# Patient Record
(demographics unavailable — no encounter records)

---

## 2024-10-18 NOTE — HISTORY OF PRESENT ILLNESS
[DTaP/IPV] : DTaP/IPV [MMR/Varicella] : MMR/Varicella [FreeTextEntry1] : 4 year M here for immunization. Presently asymptomatic.  No history of adverse reactions to previous vaccines.

## 2024-10-18 NOTE — DISCUSSION/SUMMARY
[] : The components of the vaccine(s) to be administered today are listed in the plan of care. The disease(s) for which the vaccine(s) are intended to prevent and the risks have been discussed with the caretaker.  The risks are also included in the appropriate vaccination information statements which have been provided to the patient's caregiver.  The caregiver has given consent to vaccinate. [FreeTextEntry1] : Due for Quadracel and Proquad vaccine today. After discussing risks/ benefits, parent in agreement with administration. VIS given. Recommend warm/cold compress for any post vaccine pain, redness or swelling. Pain meds as needed

## 2024-12-05 NOTE — DISCUSSION/SUMMARY
[FreeTextEntry1] : Symptoms likely due to viral URI. Recommend supportive care including humidifier, and daily antihistamine: Claritin Children's or equivalent

## 2024-12-05 NOTE — PHYSICAL EXAM
[Pale Nasal Mucosa] : pale nasal mucosa [Clear Rhinorrhea] : clear rhinorrhea [Erythematous Oropharynx] : erythematous oropharynx [Enlarged Tonsils] : enlarged tonsils [NL] : warm, clear [de-identified] : postnasal drip

## 2024-12-05 NOTE — HISTORY OF PRESENT ILLNESS
[___ Week(s)] : [unfilled] week(s) [Intermittent] : intermittent [de-identified] : Cough and nasal congestion

## 2025-03-21 NOTE — PHYSICAL EXAM
[NL] : no acute distress, alert [de-identified] : erythematous, papulovesicular eruption on trunk, face and arms. Right hand with round, scaly, and itchy patch with a slightly raised, advancing border.

## 2025-03-21 NOTE — DISCUSSION/SUMMARY
[FreeTextEntry1] : 3 yo boy with atopic dermatitis and tinea corporis Recommend: -Keep area clean and dry. -Apply hydrocortisone 1% to affected area twice a day for at least 5 days. -Oral antihistamines such as diphenhydramine (Benadryl) can also relieve itching. -Protect the skin with an emollient ointment. (Aquaphor)  For tinea corporis: Recommendations: -After touching the area with ringworm, wash your hands before touching another area of your body. -Touching or scratching the area with ringworm and then touching another area can spread ringworm from one part of your body to another. -Keep the infected area clean and dry. -Change the clothes, including underwear and socks, every day. Wash the clothes before wearing them again. -Avoid sharing towels and other personal items. You can easily spread ringworm to others by sharing tels, hats, recinos, and other personal items. -Disinfect or throw out infected items. The fungi that cause ringworm can survive for a long time. -To avoid re-infecting yourself with infected items, you should wash clothes, towels, and bedding that you use while you have ringworm. Be sure to wash everything in hot, soapy water.  Parental questions and concerns addressed. If no improvement, consider peds. dermatology referral  Call the office or return if symptoms persist or worsen.

## 2025-03-21 NOTE — HISTORY OF PRESENT ILLNESS
[Derm Symptoms] : DERM SYMPTOMS [Rash] : rash [Trunk] : trunk [Extremities] : extremities [___ Week(s)] : [unfilled] week(s) [Recent URI] : recent URI [Erythematous] : erythematous [Itchy] : itchy [Scaly] : scaly [Dry] : dry [Bathing] : bathing [OTC creams/ointments] : OTC creams/ointments [Topical Steroids] : topical steroids [Pruritus] : pruritus [New Food] : no new food [New Clothing] : no new clothing [New Skin Products] : no new skin products [Sick Contacts: ___] : no sick contacts [Fever] : no fever [Vomiting] : no vomiting [Discharge from affected areas] : no discharge from affected areas [Diarrhea] : no diarrhea [Bleeding from affected areas] : no bleeding from affected areas [de-identified] : Was seen at  for rash, prescribed Clotrimazole but it did not work. Dog constantly licking his face.

## 2025-04-18 NOTE — REASON FOR VISIT
[Follow-Up Visit] : a follow-up visit for [Patient] : patient [Mother] : mother [Medical Records] : medical records [FreeTextEntry2] : Elevated lead level

## 2025-04-18 NOTE — FAMILY HISTORY
[Black/] : Black/ [White] : White [Age ___] : Age: [unfilled] [Healthy] : healthy [Full] : full brother [] : Non- [FreeTextEntry2] : Currently pregnant  [de-identified] : Hx of lead level elevation; Since resolved

## 2025-04-18 NOTE — PAST MEDICAL HISTORY
[At ___ Weeks Gestation] : at [unfilled] weeks gestation [[place] [ Section] : by  section [Jaundice] :  jaundice [Phototherapy] : phototherapy [NICU] : NICU [In Vitro Fertilization] : Pregnancy no in vitro fertilization [Transfusion] : no transfusion [Exchange Transfusion] : no exchange transfusion [Age Appropriate] : not age appropriate  [FreeTextEntry4] : Placental complications, heart rate deceleration, Maternal GDM, oligohydraminos, decreased fetal movement  [de-identified] : In NICU for two months  [FreeTextEntry5] : S/p feeding tube and NICU stay

## 2025-04-18 NOTE — REVIEW OF SYSTEMS
[Epistaxis] : epistaxis [Negative] : Musculoskeletal [FreeTextEntry8] : Incontinent baseline  [FreeTextEntry7] : See allergy list  [FreeTextEntry9] : Incontinent baseline  [de-identified] : Autism Spectrum Dx, non-verbal baseline   [de-identified] : Autism Spectrum, non-verbal baseline

## 2025-04-18 NOTE — SOCIAL HISTORY
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

## 2025-04-18 NOTE — PHYSICAL EXAM
[Thin] : thin [Normal] : normal appearance, no rash, nodules, vesicles, ulcers, erythema [de-identified] : Low muscle tone  [de-identified] : Patient delayed, baseline. Calm and cooperative  [de-identified] : Patient delayed, baseline. Calm and cooperative

## 2025-04-18 NOTE — HISTORY OF PRESENT ILLNESS
[Solid Foods] : eating solid foods [___ ounces/feeding] : ~LEN urena/feeding [___ Times/day] : [unfilled] times/day [de-identified] : Orestes Payan (: 9/15/2020) is a 5 y/o male patient with Autism Spectrum Disorder presenting to Hematology for a work-up related to elevated lead levels.  Most recent lead level was on  at 11.7 ug/dl.  CBC was normal at that time.  PCP was previously Dr. Sukhi Terrazas. Currently, PCP is Dr. Marvel Alvarez in [place].  [de-identified] : Mother states Orestes is doing well at home. He is starting soccer, active and playful.  She reports an increase in dietary intake and a decrease in milk consumption.  Mother denies active bleeding, but reports infrequent and inconsistent epistaxis due to weather changes.  Denies any other concerns.  Orestes presents today for repeat iron studies and reassessment of lead level.  She reports he still likes to put things from outside in his mouth, such as grass.    [de-identified] : See HPI  [de-identified] : Cow's milk

## 2025-05-21 NOTE — PHYSICAL EXAM
[NL] : moves all extremities x4, warm, well perfused x4 [Warm] : warm [Clear] : clear [de-identified] : erythematous, papulovesicular eruption on right antecubital area, generalized dry skin

## 2025-05-21 NOTE — DISCUSSION/SUMMARY
[FreeTextEntry1] : Flexural eczema-daily moisturizer and topical steroid as needed. Side effect of topical steroids includes but not limited to lightening of skin.  Recommend: -Keep area clean and dry. -Apply hydrocortisone 1% to affected area twice a day for at least 5 days. -Oral antihistamines such as diphenhydramine (Benadryl) can also relieve itching. -Protect the skin with an emollient ointment. (Aquaphor)  Call the office or return if symptoms persist or worsen.

## 2025-05-21 NOTE — HISTORY OF PRESENT ILLNESS
[Derm Symptoms] : DERM SYMPTOMS [Rash] : rash [___ Day(s)] : [unfilled] day(s) [Erythematous] : erythematous [Itchy] : itchy [Pruritus] : pruritus [Fever] : no fever [Reducted Appetite] : no reduced appetite [URI Symptoms] : no URI symptoms [Vomiting] : no vomiting [Discharge from affected areas] : no discharge from affected areas [Diarrhea] : no diarrhea [FreeTextEntry9] : right arm

## 2025-06-13 NOTE — PHYSICAL EXAM
[FreeTextEntry3] : Focused skin exam performed  The relevant portions of the exam were performed today  AAOx3, NAD, well-appearing / pleasant Focused examination within normal limits with the exception of:  - AC fossa w/ eczematous plaques

## 2025-06-13 NOTE — HISTORY OF PRESENT ILLNESS
[FreeTextEntry1] : NPV: eczema  [de-identified] : TORSTEN BAUTISTA is a 4 year old who is presenting for evaluation of:   1. Eczema had as a child, improved but recently recurred, just using otc cortisone  using eucerin eczema cream   Social hx: Here with mom and 2 siblings

## 2025-06-13 NOTE — ASSESSMENT
[Use of independent historian: [ enter independent historian's relationship to patient ] :____] : As the patient was unable to provide a complete and reliable history, I obtained clinical history from the patient's [unfilled] [FreeTextEntry1] : Atopic dermatitis, mild and limited  Xerosis cutis  Chronic condition, flaring - Discussed diagnosis, natural course and treatment plan for disease flares and maintenance strategies to prevent future flares - Dry skin care reviewed including use of fragrance-free products and liberal OTC emollient use BID.  - START  Triamcinolone 0.1% ointment BID to AA 2 weeks on / 1 week off PRN flare. Avoid face, groin, axilla.  SED including atrophy, dyspigmentation, telangiectasias, striae. Proper use reviewed including only using to affected area and avoidance of prolonged use.   RTC PRN

## 2025-06-26 NOTE — HISTORY OF PRESENT ILLNESS
[FreeTextEntry1] : RPV: eczema  [de-identified] : 4yoM with history of eczema. Last seen 2 weeks ago, at that time given triamcinolone ointment. Mom worried he is having an allergic reaction to it. Has itchy spots on arm and chest. Prefers HC 2.5% ointment as that is what he has used since he was a baby. Using more detergent in wash since clothes had a wet smell  Social hx: Here with mom and 2 siblings

## 2025-06-26 NOTE — ASSESSMENT
[Use of independent historian: [ enter independent historian's relationship to patient ] :____] : As the patient was unable to provide a complete and reliable history, I obtained clinical history from the patient's [unfilled] [FreeTextEntry1] : #Atopic dermatitis, mild and limited  #Xerosis cutis  Chronic condition, flaring - no allergic reaction on exam today - use fragrance free products, like tide/all free and clear - Dry skin care -liberal OTC emollient use BID. cerave, cetaphil, vaseline - START HC 2.5%  ointment BID to AA 2 weeks on / 1 week off PRN flare. Avoid face, groin, axilla.  SED including atrophy, dyspigmentation, telangiectasias, striae. Proper use reviewed including only using to affected area and avoidance of prolonged use.

## 2025-06-26 NOTE — PHYSICAL EXAM
[FreeTextEntry3] : General: well appearing person in nad, alert, pleasant Focused Skin Exam per patient preference: R arm and chest with excoriations, linear

## 2025-07-22 NOTE — PHYSICAL EXAM
[Thin] : thin [Normal] : normal appearance, no rash, nodules, vesicles, ulcers, erythema [de-identified] : Low muscle tone  [de-identified] : Patient delayed, baseline. Calm and cooperative  [de-identified] : Patient delayed, baseline. Calm and cooperative

## 2025-07-22 NOTE — PAST MEDICAL HISTORY
[At ___ Weeks Gestation] : at [unfilled] weeks gestation [[place] [ Section] : by  section [Jaundice] :  jaundice [Phototherapy] : phototherapy [NICU] : NICU [In Vitro Fertilization] : Pregnancy no in vitro fertilization [Transfusion] : no transfusion [Exchange Transfusion] : no exchange transfusion [Age Appropriate] : not age appropriate  [FreeTextEntry4] : Placental complications, heart rate deceleration, Maternal GDM, oligohydraminos, decreased fetal movement  [de-identified] : In NICU for two months  [FreeTextEntry5] : S/p feeding tube and NICU stay

## 2025-07-22 NOTE — REVIEW OF SYSTEMS
[Negative] : ENT [FreeTextEntry8] : Incontinent baseline  [FreeTextEntry7] : See allergy list  [FreeTextEntry9] : Incontinent baseline  [de-identified] : Autism Spectrum Dx, non-verbal baseline   [de-identified] : Autism Spectrum, non-verbal baseline

## 2025-07-22 NOTE — HISTORY OF PRESENT ILLNESS
[Solid Foods] : eating solid foods [___ ounces/feeding] : ~LEN urena/feeding [___ Times/day] : [unfilled] times/day [de-identified] : Orestes Payan (: 9/15/2020) is a 3 y/o male patient with Autism Spectrum Disorder presenting to Hematology for a work-up related to elevated lead levels.  Most recent lead level was on  at 11.7 ug/dl.  CBC was normal at that time.  PCP was previously Dr. Sukhi Terrazas. Currently, PCP is Dr. Marvel Alvarez in [place].  [de-identified] : Orestes presents today for repeat iron studies and reassessment of lead level. Most recent lead level 4/18/25 was 8.9. Mom reports he still likes to put things from outside in his mouth, such as grass and green leaves. He puts in his mouth and then takes out. Eats furniture cushions. His new room will have no carpet so that he can't eat the carpet. Bites wood as well.  Dad moving, looking for new house. House with lead will no longer be in picture. Will get inspection done prior to moving for lead.  No recent illness. Allergist yesterday special soap and lotion and hydrocortisone for extreme eczema.  Eating well- eats chicken, pizza, cruz. Likes peas and spinach. No milk intake.   [de-identified] : See HPI  [de-identified] : Cow's milk

## 2025-07-22 NOTE — FAMILY HISTORY
[Black/] : Black/ [White] : White [Healthy] : healthy [Full] : full brother [] : Non- [Age ___] : Age: [unfilled] [de-identified] : Hx of lead level elevation; Since resolved

## 2025-07-22 NOTE — SOCIAL HISTORY
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